# Patient Record
Sex: FEMALE | Race: OTHER | Employment: FULL TIME | URBAN - METROPOLITAN AREA
[De-identification: names, ages, dates, MRNs, and addresses within clinical notes are randomized per-mention and may not be internally consistent; named-entity substitution may affect disease eponyms.]

---

## 2019-03-21 ENCOUNTER — HOSPITAL ENCOUNTER (OUTPATIENT)
Dept: LAB | Age: 29
Discharge: HOME | End: 2019-03-21

## 2019-03-21 ENCOUNTER — OFFICE VISIT (OUTPATIENT)
Dept: FAMILY MEDICINE CLINIC | Age: 29
End: 2019-03-21

## 2019-03-21 VITALS
TEMPERATURE: 97.8 F | HEIGHT: 55 IN | HEART RATE: 63 BPM | DIASTOLIC BLOOD PRESSURE: 56 MMHG | SYSTOLIC BLOOD PRESSURE: 113 MMHG | WEIGHT: 105.8 LBS | BODY MASS INDEX: 24.48 KG/M2

## 2019-03-21 DIAGNOSIS — N89.8 VAGINAL DISCHARGE: Primary | ICD-10-CM

## 2019-03-21 DIAGNOSIS — N89.8 VAGINAL DISCHARGE: ICD-10-CM

## 2019-03-21 PROCEDURE — 87086 URINE CULTURE/COLONY COUNT: CPT

## 2019-03-21 PROCEDURE — 80048 BASIC METABOLIC PNL TOTAL CA: CPT

## 2019-03-21 RX ORDER — FLUCONAZOLE 150 MG/1
150 TABLET ORAL DAILY
Qty: 1 TAB | Refills: 0 | Status: SHIPPED | OUTPATIENT
Start: 2019-03-21 | End: 2019-03-22

## 2019-03-21 RX ORDER — METRONIDAZOLE 500 MG/1
500 TABLET ORAL 2 TIMES DAILY
Qty: 14 TAB | Refills: 0 | Status: SHIPPED | OUTPATIENT
Start: 2019-03-21 | End: 2019-03-28

## 2019-03-21 NOTE — PROGRESS NOTES
Assessment/Plan:       ICD-10-CM ICD-9-CM    1. Vaginal discharge N89.8 623.5 CULTURE, URINE      METABOLIC PANEL, BASIC      metroNIDAZOLE (FLAGYL) 500 mg tablet          [facility] [address] [place] [address] [address] [address]  Phone: [phone] Fax: [phone]      [address]  Subjective:     Chief Complaint   Patient presents with    Vaginal Discharge    Kellie Forrest is a 29 y.o. OTHER female. HPI: while she was pregnant she was told her glucose was a little high. FH negative for diabetes. This was in [place]. How long? 4 months  Character? Yellow, pain, itch, burn, odor  Ever before? After having her baby she had this. This was 5 years ago. No change in weight/fit of clothes. She went to the doctor and had a pap smear and had treatment. She  has no past medical history on file. Review of Systems: Negative for: fever, chest pain, shortness of breath, leg swelling, exertional dyspnea, palpitations. Current Medications:   No current outpatient medications on file prior to visit. No current facility-administered medications on file prior to visit. Social History: She  reports that she has never smoked. She has never used smokeless tobacco. She reports that she drank alcohol. She reports that she does not use drugs. Objective:     Vitals:    03/21/19 1138   BP: 113/56   Pulse: 63   Temp: 97.8 °F (36.6 °C)   TempSrc: Oral   Weight: 105 lb 12.8 oz (48 kg)   Height: 4' 7.32\" (1.405 m)    Patient's last menstrual period was 03/11/2019. Wt Readings from Last 2 Encounters:   03/21/19 105 lb 12.8 oz (48 kg)     Physical Examination:  Pelvic exam - no cervical motion tenderness, no adnexal tenderness; malodorous clear  Discharge noted  General appearance - well developed, no acute distress. Chest - clear to auscultation.   Heart - regular rate and rhythm without murmurs, rubs, or gallops. Abdomen - bowel sounds present x 4, + suprapubic tenderness, ND. Extremities - pulses intact. No peripheral edema. Assessment/Plan:   Diagnoses and all orders for this visit:    1. Vaginal discharge  -     CULTURE, URINE; Future  -     METABOLIC PANEL, BASIC; Future  -     metroNIDAZOLE (FLAGYL) 500 mg tablet; Take 1 Tab by mouth two (2) times a day for 7 days. Turkmen sig    Other orders  -     fluconazole (DIFLUCAN) 150 mg tablet; Take 1 Tab by mouth daily for 1 day. Turkmen sig       While menstruating doesn't have the odor but this comes and goes. Kimmy Whipple, DNP, FNP-BC, BC-ADM  Kellie Burrell expressed understanding of this plan.

## 2019-03-21 NOTE — PROGRESS NOTES
Patient seen for discharge with assistance from alicia Lopez. We reviewed the AVS, prescriptions, pharmacy location and the two printed Good Rx coupons for her prescriptions. She has been to the lab and had no questions at the time of discharge.  Ariela Hahn RN

## 2019-03-22 LAB
ANION GAP SERPL CALC-SCNC: 3 MMOL/L (ref 5–15)
BUN SERPL-MCNC: 11 MG/DL (ref 6–20)
BUN/CREAT SERPL: 19 (ref 12–20)
CALCIUM SERPL-MCNC: 9.2 MG/DL (ref 8.5–10.1)
CHLORIDE SERPL-SCNC: 106 MMOL/L (ref 97–108)
CO2 SERPL-SCNC: 30 MMOL/L (ref 21–32)
CREAT SERPL-MCNC: 0.57 MG/DL (ref 0.55–1.02)
GLUCOSE SERPL-MCNC: 88 MG/DL (ref 65–100)
POTASSIUM SERPL-SCNC: 4 MMOL/L (ref 3.5–5.1)
SODIUM SERPL-SCNC: 139 MMOL/L (ref 136–145)

## 2019-03-23 LAB
BACTERIA SPEC CULT: NORMAL
CC UR VC: NORMAL
SERVICE CMNT-IMP: NORMAL

## 2019-07-11 ENCOUNTER — CLINICAL SUPPORT (OUTPATIENT)
Dept: FAMILY MEDICINE CLINIC | Age: 29
End: 2019-07-11

## 2019-07-11 DIAGNOSIS — N89.8 VAGINAL DISCHARGE: Primary | ICD-10-CM

## 2019-07-11 NOTE — PROGRESS NOTES
Pt came to clinic to request lab results from March, 2019. RN reviewed Monse Duty lab result note:  Notes recorded by Narciso Stanley NP on 3/23/2019 at 5:08 PM EDT  Negative for UTI.  Treatment was appropriate.  Return if not improving. Pt reported that her symptoms improved and does not have any issues now.   Ervin Handley RN

## 2019-11-19 ENCOUNTER — OFFICE VISIT (OUTPATIENT)
Dept: FAMILY MEDICINE CLINIC | Age: 29
End: 2019-11-19

## 2019-11-19 VITALS
TEMPERATURE: 97.6 F | HEIGHT: 57 IN | BODY MASS INDEX: 23.3 KG/M2 | DIASTOLIC BLOOD PRESSURE: 77 MMHG | WEIGHT: 108 LBS | SYSTOLIC BLOOD PRESSURE: 131 MMHG | HEART RATE: 63 BPM

## 2019-11-19 DIAGNOSIS — Z23 ENCOUNTER FOR IMMUNIZATION: ICD-10-CM

## 2019-11-19 DIAGNOSIS — N30.00 ACUTE CYSTITIS WITHOUT HEMATURIA: ICD-10-CM

## 2019-11-19 DIAGNOSIS — R19.5 HARD STOOL: ICD-10-CM

## 2019-11-19 DIAGNOSIS — N89.8 VAGINAL DISCHARGE: Primary | ICD-10-CM

## 2019-11-19 LAB
BILIRUB UR QL STRIP: NEGATIVE
GLUCOSE UR-MCNC: NEGATIVE MG/DL
KETONES P FAST UR STRIP-MCNC: NEGATIVE MG/DL
PH UR STRIP: 6.5 [PH] (ref 4.6–8)
PROT UR QL STRIP: NEGATIVE
SP GR UR STRIP: 1.01 (ref 1–1.03)
UA UROBILINOGEN AMB POC: NORMAL (ref 0.2–1)
URINALYSIS CLARITY POC: NORMAL
URINALYSIS COLOR POC: YELLOW
URINE BLOOD POC: NEGATIVE
URINE LEUKOCYTES POC: NORMAL
URINE NITRITES POC: NEGATIVE

## 2019-11-19 RX ORDER — NITROFURANTOIN 25; 75 MG/1; MG/1
100 CAPSULE ORAL 2 TIMES DAILY
Qty: 14 CAP | Refills: 0 | Status: SHIPPED | OUTPATIENT
Start: 2019-11-19 | End: 2021-08-30

## 2019-11-19 NOTE — PROGRESS NOTES
Kellie Syed [facility]  Requests flu vaccine. Denies fever and egg allergy. VIS information sheet given to patient. Explained possible s/e. Reviewed s/sx indicating need to be seen in ER. Patient had no adverse reaction at time of discharge. Entered into VIIS. GIVEN BY GRETEL KAUFFMAN, Citlaly Mccann LPN.  Mikey Matt RN

## 2019-11-19 NOTE — PROGRESS NOTES
Assessment/Plan:    Diagnoses and all orders for this visit:    1. Vaginal discharge  -     AMB POC URINALYSIS DIP STICK MANUAL W/O MICRO    2. Hard stool  -     docusate sodium (COLACE) 50 mg capsule; Take 1 Cap by mouth two (2) times daily as needed for Constipation. French sig    3. Acute cystitis without hematuria  -     nitrofurantoin, macrocrystal-monohydrate, (MACROBID) 100 mg capsule; Take 1 Cap by mouth two (2) times a day. [place] 1 [employer] veces al isabela por 7 godinez        Follow-up and Dispositions    · Return if symptoms worsen or fail to improve, for pap clinic . PERLA Maldnoado expressed understanding of this plan. An AVS was printed and given to the patient.      ----------------------------------------------------------------------    Chief Complaint   Patient presents with    Constipation     x 2 weeks    Vaginal Discharge     x 1 month       History of Present Illness:  33 yo single mother, here for burning with urination for 1 week. Last time she had intercourse was 1 month ago and it was PROTECTED intercourse. She states that she always uses condoms due to the risk of infection. She has yellowish, non odorous vaginal discharge. Her LMP was one week ago. She has no pelvic pain. She is , she has a 10 yo daughter  No fever, no back pain   She complains of constipation but when questioned further she has daily BM but it is described as \"hard stool\". No change in bowel habits. No past medical history on file. Current Outpatient Medications   Medication Sig Dispense Refill    nitrofurantoin, macrocrystal-monohydrate, (MACROBID) 100 mg capsule Take 1 Cap by mouth two (2) times a day. Dave 1 keyur augustin al isabela por 7 godinez 14 Cap 0    docusate sodium (COLACE) 50 mg capsule Take 1 Cap by mouth two (2) times daily as needed for Constipation.  French sig 60 Cap 2       No Known Allergies    Social History     Tobacco Use    Smoking status: Never Smoker    Smokeless tobacco: Never Used   Substance Use Topics    Alcohol use: Not Currently    Drug use: Never       No family history on file.     Physical Exam:     Visit Vitals  /77 (BP 1 Location: Left arm, BP Patient Position: Sitting)   Pulse 63   Temp 97.6 °F (36.4 °C) (Oral)   Ht 4' 8.54\" (1.436 m)   Wt 108 lb (49 kg)   LMP 11/16/2019   BMI 23.76 kg/m²     gen looks well, pleasant  A&Ox3  WDWN NAD  Respirations normal and non labored  Results for orders placed or performed in visit on 11/19/19   AMB POC URINALYSIS DIP STICK MANUAL W/O MICRO     Status: None   Result Value Ref Range Status    Color (UA POC) Yellow  Final    Clarity (UA POC) Cloudy  Final    Glucose (UA POC) Negative Negative Final    Bilirubin (UA POC) Negative Negative Final    Ketones (UA POC) Negative Negative Final    Specific gravity (UA POC) 1.010 1.001 - 1.035 Final    Blood (UA POC) Negative Negative Final    pH (UA POC) 6.5 4.6 - 8.0 Final    Protein (UA POC) Negative Negative Final    Urobilinogen (UA POC) normal 0.2 - 1 Final    Nitrites (UA POC) Negative Negative Final    Leukocyte esterase (UA POC) 1+ Negative Final               abd- soft, not tender, no masses

## 2019-11-19 NOTE — PROGRESS NOTES
[employer] phone [phone]    AVS printed and reviewed. E scripts discussed. Good Rx available for [employer]thia [employer] sent to [employer]. , Printed. PAP appt has been scheduled at registration. Encouraged to get flu vaccine at clinic today. F/u as needed by making the line to see a provider.

## 2019-11-19 NOTE — PROGRESS NOTES
Results for orders placed or performed in visit on 11/19/19   AMB POC URINALYSIS DIP STICK MANUAL W/O MICRO   Result Value Ref Range    Color (UA POC) Yellow     Clarity (UA POC) Cloudy     Glucose (UA POC) Negative Negative    Bilirubin (UA POC) Negative Negative    Ketones (UA POC) Negative Negative    Specific gravity (UA POC) 1.010 1.001 - 1.035    Blood (UA POC) Negative Negative    pH (UA POC) 6.5 4.6 - 8.0    Protein (UA POC) Negative Negative    Urobilinogen (UA POC) normal 0.2 - 1    Nitrites (UA POC) Negative Negative    Leukocyte esterase (UA POC) 1+ Negative

## 2019-11-19 NOTE — PATIENT INSTRUCTIONS
Infección urinaria en las mujeres: Instrucciones de cuidado - [ Urinary Tract Infection in Women: Care Instructions ]  Instrucciones de cuidado    Reji infección urinaria (UTI, por flakita siglas en inglés) es un término general que hace referencia a reji infección que se produce en cualquier parte entre los riñones y la uretra (conducto por el cual se expulsa la orina). La mayoría de las UTI son infecciones de la vejiga. Con frecuencia, causan dolor o ardor al MissaelGalina. Las UTI son causadas por bacterias y pueden curarse con antibióticos. Asegúrese de completar el tratamiento para que la infección desaparezca. La atención de seguimiento es reji parte clave de vera tratamiento y seguridad. Asegúrese de hacer y acudir a todas las citas, y llame a vera médico si está teniendo problemas. También es reji buena idea saber los resultados de flakita exámenes y mantener reji lista de los medicamentos que bar. ¿Cómo puede cuidarse en el hogar? · [address] No deje de tomarlos por el hecho de sentirse mejor. Debe lee todos los antibióticos hasta terminarlos. · Ke los próximos tayla o [address], alexandra mayor cantidad de [place] y otros líquidos. Gab[place] puede ayudar a eliminar las bacterias que provocan la infección. (Si tiene reji enfermedad de los riñones, el corazón o el hígado y tiene que [place]'s líquidos, hable con vera médico antes de aumentar vera consumo). · Evite las bebidas gaseosas o con cafeína. Pueden irritar la vejiga. · Orine con frecuencia. Trate de vaciar la vejiga cada vez que orine. · Para aliviar el dolor, tome un baño caliente o colóquese reji almohadilla térmica a baja temperatura sobre la parte baja del abdomen o la jackeline genital. Nunca se duerma mientras [place] reji almohadilla térmica. Para prevenir las infecciones urinarias  · Alexandra abundante agua todos los días. Gabbs la ayuda a orinar con frecuencia, lo que elimina las bacterias de vera organismo.  (Si tiene [employer] riñones, el corazón o el hígado y tiene que Tadeo's líquidos, hable con vera médico antes de aumentar vera consumo). · Orine cuando necesite hacerlo. · Orine inmediatamente después de liz tenido [employer]. · Cámbiese las toallas sanitarias con frecuencia. · Evite el uso de lavados vaginales, los omar de burbujas, los [place]ter[place] de higiene femenina y otros productos para la higiene femenina que contengan desodorantes. · Después de ir al baño, límpiese de adelante hacia atrás. ¿Cuándo debes pedir ayuda? Llama a tu médico ahora mismo o busca atención médica inmediata si:    · Aparecen síntomas jered fiebre, escalofríos, náuseas o vómitos por primera vez, o empeoran.     · Te empieza a doler la espalda, tosha debajo de la caja torácica. A esto se le llama dolor en el flanco.     · Aparece tray o pus en la orina.     · Tienes problemas con los antibióticos.    Presta especial atención a los cambios en tu irma y asegúrate de comunicarte con tu médico si:    · No mejoras después de liz tomado un antibiótico duke 2 días.     · Los síntomas desaparecen y Josefine Alexandre. ¿Dónde puede encontrar más información en inglés? Wilfred Mayfield a http://flora-fadi.info/. Karan Castañeda [id number] en la búsqueda para aprender más acerca de \"Infección urinaria en las mujeres: Instrucciones de cuidado - [ Urinary Tract Infection in Women: Care Instructions ]. \"  Revisado: 19 meagan, 2018  Versión del contenido: 12.2  © 4353-2103 Healthwise, Incorporated. Las instrucciones de cuidado fueron adaptadas bajo licencia por [employer] (which disclaims liability or warranty for this information). Si usted tiene [place] afección médica o sobre estas instrucciones, siempre pregunte a vera profesional de irma. [facility] niega toda garantía o responsabilidad por vera uso de esta información. Estreñimiento:  Instrucciones de cuidado - [ Constipation: Care Instructions ]  Instrucciones de cuidado    Tener estreñimiento significa que usted tiene dificultades para eliminar las heces (evacuaciones del intestino). Las personas eliminan heces entre 3 veces al día y Agustín Leona vez cada 3 días. Lo que es normal para usted puede ser [employer]. El estreñimiento puede ocurrir con dolor en el recto y cólicos. El dolor podría empeorar cuando trata de eliminar las heces. A veces hay pequeñas cantidades de tray charles viva en el papel higiénico o en la superficie de las heces. Jeanerette se debe a las venas dilatadas cerca del recto (hemorroides). Algunos cambios en vera Arzella Foreman y estilo de mary podrían ayudarle a evitar el estreñimiento continuo. Es posible que el médico además le recete medicamentos para ayudar a aflojar las heces. Algunos medicamentos pueden causar estreñimiento. Jeanerette incluye los analgésicos (medicamentos para el dolor) y los antidepresivos. Infórmele a vera médico sobre Marvin Carnes que usted bar. Es posible que vera médico quiera cambiar un medicamento para aliviar flakita síntomas. La atención de seguimiento es reji parte clave de vera tratamiento y seguridad. Asegúrese de hacer y acudir a todas las citas, y llame a vera médico si está teniendo problemas. También es reji buena idea saber los resultados de flakita exámenes y mantener reji lista de los medicamentos que bar. ¿Cómo puede cuidarse en el hogar? · Alexandra abundantes líquidos, los suficientes jered para que vera orina sea de color amarillo corazon o transparente jered el agua. Si tiene [employer], del corazón o del hígado y tiene que Tadeo's líquidos, hable con vera médico antes de aumentar vera consumo. · Incluya en vera dieta diaria alimentos ricos en fibra. Estos incluyen frutas, verduras, frijoles (habichuelas) y granos integrales. · Krystle por lo menos 30 minutos de ejercicio la mayoría de los días de la [place]. Caminar es reji buena opción.  Es posible que también DUVED hacer otras actividades, jered correr, nadar, montar en bicicleta, o jugar al tenis u otros deportes de equipo. · [place]aks un suplemento de Ericka, jered Citrucel o Metamucil, todos los GRASSE. Sujata y siga todas las indicaciones de la [place]. · Programe tiempo todos los días para evacuar el intestino. Andrea Siad rutina diaria podría ayudar. Tómese vera tiempo para evacuar el intestino. · Apoye los pies sobre un banco o taburete pequeño cuando se siente en el inodoro. [employer] ayuda a flexionar las caderas y coloca la pelvis en posición de cuclillas. · Vera médico podría recomendarle un laxante de venta darell para aliviar el estreñimiento. Kit Staff son Dunne[place] Face de Magnesia (Milk of Magnesia) y [place]. Sujata y siga todas las instrucciones de la [place]. No use laxantes de [employer]. ¿Cuándo debe pedir ayuda? Llame a vera médico ahora mismo o busque atención médica inmediata si:    · Tiene dolor abdominal nuevo o peor.     · Tiene náuseas o vómito nuevos o peores.     · Tiene tray en las heces.    Preste especial atención a los cambios en vera irma y asegúrese de comunicarse con vera médico si:    · Vera estreñimiento empeora.     · No mejora jered se esperaba. ¿Dónde puede encontrar más información en inglés? Andrea Brian a http://flora-fadi.info/. Escriba P343 en la búsqueda para aprender Yu Yoder de \"Estreñimiento: Instrucciones de cuidado - [ Constipation: Care Instructions ]. \"  Revisado: 26 junio, 2019  Versión del contenido: 12.2  © 9629-5540 Healthwise, Incorporated. Las instrucciones de cuidado fueron adaptadas bajo licencia por [employer] (which disclaims liability or warranty for this information). Si usted tiene [facility] afección médica o sobre estas instrucciones, siempre pregunte a vera profesional de irma. [facility] niega toda garantía o responsabilidad por vera uso de esta información.

## 2020-02-03 ENCOUNTER — HOSPITAL ENCOUNTER (OUTPATIENT)
Dept: LAB | Age: 30
Discharge: HOME | End: 2020-02-03

## 2020-02-03 ENCOUNTER — OFFICE VISIT (OUTPATIENT)
Dept: FAMILY MEDICINE CLINIC | Age: 30
End: 2020-02-03

## 2020-02-03 VITALS
DIASTOLIC BLOOD PRESSURE: 61 MMHG | TEMPERATURE: 98.2 F | HEART RATE: 69 BPM | WEIGHT: 110 LBS | HEIGHT: 57 IN | SYSTOLIC BLOOD PRESSURE: 106 MMHG | BODY MASS INDEX: 23.73 KG/M2

## 2020-02-03 DIAGNOSIS — N89.8 VAGINAL DISCHARGE: Primary | ICD-10-CM

## 2020-02-03 DIAGNOSIS — Z01.419 ENCOUNTER FOR WELL WOMAN EXAM: ICD-10-CM

## 2020-02-03 PROCEDURE — 88142 CYTOPATH C/V THIN LAYER: CPT

## 2020-02-03 PROCEDURE — 87591 N.GONORRHOEAE DNA AMP PROB: CPT

## 2020-02-03 NOTE — PROGRESS NOTES
Assessment/Plan:    Diagnoses and all orders for this visit:    1. Vaginal discharge  -     CHLAMYDIA / GC-AMPLIFIED  Low index of suspicion but she is concerned about an abnl discharge (not seen on exam today as pt is on her period)  States always uses protected sex (condoms) and last intercourse was between 6-7 months ago     2. Encounter for well woman exam  -     PAP, LIQUID BASED, MANUAL SCREEN; Future    Pt has mild tenderness with left adnexal region palpation but no masses are appreciated. She will be instructed to call if the discomfort persists and I will order a pelvic ultrasound at that time. Follow-up and Dispositions    · Return in about 1 year (around 2/3/2021). PERLA Baig expressed understanding of this plan. An AVS was printed and given to the patient.      ----------------------------------------------------------------------    Chief Complaint   Patient presents with    Well Woman     last PAP smear was 2 years ago w/ normal results. History of Present Illness:  G31, has a 9year old child  Single mother. Has a boyfriend who visits 1-2 times a year and she always uses condoms with him because \"I don't know if he has other partners\"  Is concerned that she has a yellowish discharge. We talked about vaginal discharge and the monthly changes that are expected. She states that she is not having intercourse so she doesn't think that she has any pain with intercourse (she had told me this before). She had her last pap a few years ago and it was normal  She has not had any abnl paps  She has regular periods  She is not at risk for DV at this time        No past medical history on file. Current Outpatient Medications   Medication Sig Dispense Refill    nitrofurantoin, macrocrystal-monohydrate, (MACROBID) 100 mg capsule Take 1 Cap by mouth two (2) times a day.  [place] 1 pastilla dos veces al isabela por 7 godinez 14 Cap 0    docusate sodium (COLACE) 50 mg capsule Take 1 Cap by mouth two (2) times daily as needed for Constipation. Malay sig 60 Cap 2       No Known Allergies    Social History     Tobacco Use    Smoking status: Never Smoker    Smokeless tobacco: Never Used   Substance Use Topics    Alcohol use: Not Currently    Drug use: Never       No family history on file. Physical Exam:     Visit Vitals  /61 (BP 1 Location: Left arm, BP Patient Position: Sitting)   Pulse 69   Temp 98.2 °F (36.8 °C) (Oral)   Ht 4' 8.54\" (1.436 m)   Wt 110 lb (49.9 kg)   LMP 01/27/2020   BMI 24.20 kg/m²       A&Ox3  WDWN NAD  Respirations normal and non labored  Pelvic exam- ext with some menstrual bleeding present, no lesions.  Cervix + menstrual bleeding moderate, no discharge visualized  Uterus and adnexal exam- no CMT, some mild discomfort over left adnexal region w/out any mass

## 2020-02-03 NOTE — PATIENT INSTRUCTIONS
Visita de control para personas de 18 a 50 años: Instrucciones de cuidado - [ Well Visit, Ages 25 to 48: Care Instructions ]  Instrucciones de cuidado    Los exámenes físicos pueden ayudarle a mantenerse saludable. Vera médico payne revisado vera estado general de irma y podría haberle dado algunos consejos para cuidarse. También podría haberle recomendado otros exámenes. En vera hogar, usted puede ayudar a prevenir enfermedades si come de manera saludable, hace ejercicio con regularidad y sigue otras recomendaciones. La atención de seguimiento es reji parte clave de vera tratamiento y seguridad. Asegúrese de hacer y acudir a todas las citas, y llame a vera médico si está teniendo problemas. También es reji buena idea saber los resultados de flakita exámenes y mantener reji lista de los medicamentos que bar. ¿Cómo puede cuidarse en el [facility]? · Alcance un peso saludable y [place]. [place] disminuirá el riesgo de tener [employer], tales jered obesidad, diabetes, enfermedad cardíaca y presión arterial guevara. · Krystle actividad física por lo menos 30 minutos la mayoría de los días de la [place]. Caminar es reji buena opción. Byron Dus desee hacer otras actividades, jered correr, nadar, [employer], o jugar al tenis u otros deportes de equipo. Discuta con vera médico cualquier cambio que quiera introducir en vera programa de ejercicios. · No fume ni permita que otros fumen cerca de usted. Si necesita ayuda para dejar de fumar, hable con vera médico sobre programas y medicamentos para dejar de fumar. Pueden aumentar flakita probabilidades de dejar el hábito para siempre. · Consulte con vera médico si presenta factores de riesgo de infecciones de transmisión sexual (STI, por flakita siglas en inglés). Tener reji guzman irma sexual (que no tenga STI y que no tenga relaciones sexuales con nadie más) es reji buena manera de evitar estas infecciones. · Utilice métodos anticonceptivos si no desea tener hijos en della momento.  Consulte con vera médico acerca de las opciones disponibles más adecuadas para usted. · Protéjase la piel del exceso de sol. [address] 10 a.m. y las 4 p.m., permanezca a la wayne o Emmit Topher con prendas de vestir y un sombrero de ala ancha. Use gafas de sol que bloqueen los miles ultravioleta. Póngase un protector solar de amplio espectro (SPF 30 o superior) en la piel expuesta, incluso cuando esté nublado. · Acuda al dentista CHARLENE [facility] veces al año para hacerse chequeos y limpiezas dentales. · En el automóvil, use el cinturón de seguridad. Siga las recomendaciones de vera médico acerca de cuándo hacerse determinados exámenes. Estas pruebas pueden detectar problemas a tiempo. Para ambos sexos  · Colesterol. Hágase un análisis de la grasa (colesterol) en la tray después de los 21 años de Sebastian. Vera médico le indicará con qué frecuencia debe [employer] análisis según vera edad, flakita antecedentes familiares u otros factores que pueden aumentar el riesgo de enfermedad cardíaca. · Presión arterial. Hágase lee la presión arterial duke reji visita de rutina al médico. Vera médico le dirá con qué frecuencia debe revisarse la presión arterial según vera edad, flakita niveles de presión arterial y otros factores. · Visión. Hable con vera médico acerca de la frecuencia con que debe hacerse reji prueba de glaucoma. · Diabetes. Pregúntele a vera médico si debería hacerse pruebas para la diabetes. · Cáncer de colon. Vera riesgo de cáncer colorrectal aumenta a medida que envejece. Algunos especialistas dicen que los adultos deberían comenzar a hacerse pruebas de detección regulares a los 48 años y dejar de hacérselas a los 76 años. Otros dicen que hay que comenzar antes de los 48 años o continuar después de los 75 [address] [facility]. Hable con vera médico acerca de vera riesgo y de cuándo comenzar y dejar de hacerse pruebas de detección. Para las mujeres  · Examen de jhonatan y [place].  Pregúntele a vera médico cuándo debe hacerse un examen clínico de los senos (mamas) y Ndiaye. Los expertos médicos no están de acuerdo en si reji samantha de menos de 48 años de edad debe o no hacerse estos exámenes o con qué frecuencia. Vera médico puede ayudarle a decidir qué es lo adecuado para usted. · Prueba de detección del cáncer del divina uterino y examen pélvico. Comience a hacerse la prueba de Papanicolaou a los 21 años. La prueba a menudo es parte de un examen pélvico. A partir de los 30 Los ryley, puede optar por hacerse reji prueba de Papanicolaou, Florestine Niko prueba del [facility] o ambas pruebas al mismo tiempo (lo que se llama prueba doble o conjunta). Hable con vera médico sobre la frecuencia con la que debe realizarse las pruebas. · Infecciones de transmisión sexual (STI, por flakita siglas en inglés). Consulte si debe hacerse pruebas de detección de STI. Puede correr riesgo si tiene [employer] con más de Florestine Niko persona, especialmente si flakita parejas no utilizan condones. Para los hombres  · Infecciones de transmisión sexual (STI). Consulte si debe hacerse pruebas de detección de STI. Puede correr riesgo si tiene [employer] con más de Florestine Niko persona, especialmente si usted no utiliza condón. · Examen para el cáncer testicular. Pregúntele a vera médico si debería hacerse un examen de testículos con regularidad. · Examen de próstata. Hable con vera médico para saber si debe hacerse un análisis de tray para el cáncer de próstata (prueba del PSA, por flakita siglas en inglés). Los expertos difieren en cuanto a si los hombres deben hacerse esta prueba y con qué frecuencia. Algunos especialistas lo recomiendan a las personas mayores de 39 años de origen afroamericano o que tienen un padre o un bridget que tuvo cáncer de próstata antes de los 65 Los ryley. ¿Cuándo debe pedir ayuda? Preste especial atención a los cambios en vera irma y asegúrese de comunicarse con vera médico si tiene problemas o síntomas que le preocupan. ¿Dónde puede encontrar más información en inglés?   Krista Clay a http://flora-fadi.info/. Escriba P072 en la búsqueda para aprender más acerca de \"Visita de control para personas de 18 a 50 años: Instrucciones de cuidado - [ Well Visit, Ages 25 to 48: Care Instructions ]. \"  Revisado: 13 meagan, 2018  Versión del contenido: 12.2  © 0675-8122 Healthwise, Incorporated. Las instrucciones de cuidado fueron adaptadas bajo licencia por [employer] (which disclaims liability or warranty for this information). Si usted tiene [facility] afección médica o sobre estas instrucciones, siempre pregunte a vera profesional de irma. Healthwise, Incorporated niega toda garantía o responsabilidad por vera uso de esta información.

## 2020-02-03 NOTE — PROGRESS NOTES
.The following was performed at discharge station per provider with the assistance of , Jasper Turner:    AVS printed, reviewed with pt and given to pt. RN advised pt to call the RN at the office if her discomfort does not go away in a few days, and an US of left ovary would be ordered. RN advised that she will be notified if the GC/Ct lab is positive. Pt expressed understanding of the above information. Daniella Hager.

## 2020-02-05 LAB
C TRACH DNA SPEC QL NAA+PROBE: NEGATIVE
N GONORRHOEA DNA SPEC QL NAA+PROBE: NEGATIVE
SAMPLE TYPE: NORMAL
SERVICE CMNT-IMP: NORMAL
SPECIMEN SOURCE: NORMAL

## 2020-02-18 NOTE — PROGRESS NOTES
Tc to the pt. 2x on the home number listed. No answer. Left a message. Tc to the emergency # 2x. No answer. Left a message. A letter was mailed to the pt with the message to contact the [facility] office for her lab results.  Maximilian Lomeli RN

## 2021-08-30 ENCOUNTER — OFFICE VISIT (OUTPATIENT)
Dept: FAMILY MEDICINE CLINIC | Age: 31
End: 2021-08-30

## 2021-08-30 ENCOUNTER — HOSPITAL ENCOUNTER (OUTPATIENT)
Dept: LAB | Age: 31
Discharge: HOME | End: 2021-08-30

## 2021-08-30 VITALS
WEIGHT: 110 LBS | OXYGEN SATURATION: 100 % | HEIGHT: 55 IN | HEART RATE: 68 BPM | TEMPERATURE: 98 F | DIASTOLIC BLOOD PRESSURE: 60 MMHG | SYSTOLIC BLOOD PRESSURE: 100 MMHG | BODY MASS INDEX: 25.46 KG/M2

## 2021-08-30 DIAGNOSIS — R10.13 EPIGASTRIC PAIN: ICD-10-CM

## 2021-08-30 DIAGNOSIS — R10.13 EPIGASTRIC PAIN: Primary | ICD-10-CM

## 2021-08-30 PROCEDURE — 99213 OFFICE O/P EST LOW 20 MIN: CPT | Performed by: FAMILY MEDICINE

## 2021-08-30 PROCEDURE — 83036 HEMOGLOBIN GLYCOSYLATED A1C: CPT

## 2021-08-30 PROCEDURE — 84443 ASSAY THYROID STIM HORMONE: CPT

## 2021-08-30 PROCEDURE — 80061 LIPID PANEL: CPT

## 2021-08-30 PROCEDURE — 85025 COMPLETE CBC W/AUTO DIFF WBC: CPT

## 2021-08-30 RX ORDER — FAMOTIDINE 40 MG/1
40 TABLET, FILM COATED ORAL
Qty: 30 TABLET | Refills: 3 | Status: SHIPPED | OUTPATIENT
Start: 2021-08-30

## 2021-08-30 NOTE — PROGRESS NOTES
HISTORY OF PRESENT ILLNESS  Kellie Cheatham is a 32 y.o. female. HPI  Patient states she feels like her pressure has increased. Her weight has decreased. And feels her abdomen is bloated. She is having epigastric pain. Worse when she eats. States she has gastric reflux. And feels the acid in her throat. She has 1 daughter, age 9. Denies any problems during pregnancy. Review of Systems   Constitutional: Negative for chills and fever. HENT: Negative for hearing loss, sinus pain and tinnitus. Eyes: Negative for blurred vision, double vision and photophobia. Cardiovascular: Negative for chest pain and palpitations. Gastrointestinal: Negative for heartburn, nausea and vomiting. Skin: Negative for itching and rash. /60 (BP 1 Location: Left arm, BP Patient Position: Sitting)   Pulse 68   Temp 98 °F (36.7 °C)   Ht 4' 6.92\" (1.395 m)   Wt 110 lb (49.9 kg)   LMP 08/22/2021   SpO2 100%   BMI 25.64 kg/m²   Physical Exam  Constitutional:       General: She is not in acute distress. HENT:      Head: Normocephalic. Right Ear: Tympanic membrane normal.      Left Ear: Tympanic membrane normal.      Nose: Nose normal. No congestion. Cardiovascular:      Rate and Rhythm: Normal rate and regular rhythm. Pulses: Normal pulses. Heart sounds: Normal heart sounds. No murmur heard. Pulmonary:      Effort: Pulmonary effort is normal. No respiratory distress. Breath sounds: Normal breath sounds. No wheezing or rhonchi. Abdominal:      General: Bowel sounds are normal. There is no distension. Palpations: Abdomen is soft. Tenderness: There is no abdominal tenderness. Hernia: No hernia is present. Musculoskeletal:         General: No swelling, deformity or signs of injury. Normal range of motion. Cervical back: Normal range of motion. No rigidity. Skin:     General: Skin is warm. Findings: No erythema or rash.    Neurological:      Mental Status: She is alert. ASSESSMENT and PLAN  Diagnoses and all orders for this visit:    1. Epigastric pain  -     CBC WITH AUTOMATED DIFF; Future  -     LIPID PANEL; Future  -     METABOLIC PANEL, COMPREHENSIVE; Future  -     TSH 3RD GENERATION; Future  -     HEMOGLOBIN A1C WITH EAG; Future  -     famotidine (PEPCID) 40 mg tablet; Take 1 Tablet by mouth nightly. 32year old female with epigastric pain.   Dietary changes discussed, we will write for pepcid  Check labs today  Follow up as needed

## 2021-08-30 NOTE — PROGRESS NOTES
Coordination of Care  1. Have you been to the ER, urgent care clinic since your last visit? Hospitalized since your last visit? No    2. Have you seen or consulted any other health care providers outside of the [address] [place] [address] since your last visit? Include any pap smears or colon screening. No    Does the patient need refills? NO    Learning Assessment Complete?  yes  Depression Screening complete in the past 12 months? yes

## 2021-08-30 NOTE — PROGRESS NOTES
I reviewed with patient medications sent to pharmacy and gave Good RX card. I gave handout to patient that explains the Good RX process. Dental resources provided. An After Visit Summary was printed and reviewed with the patient.   Gaston Ortega

## 2021-08-31 LAB
BASOPHILS # BLD: 0 K/UL (ref 0–0.1)
BASOPHILS NFR BLD: 1 % (ref 0–1)
CHOLEST SERPL-MCNC: 191 MG/DL
DIFFERENTIAL METHOD BLD: ABNORMAL
EOSINOPHIL # BLD: 0.2 K/UL (ref 0–0.4)
EOSINOPHIL NFR BLD: 3 % (ref 0–7)
ERYTHROCYTE [DISTWIDTH] IN BLOOD BY AUTOMATED COUNT: 12.1 % (ref 11.5–14.5)
EST. AVERAGE GLUCOSE BLD GHB EST-MCNC: 105 MG/DL
HBA1C MFR BLD: 5.3 % (ref 4–5.6)
HCT VFR BLD AUTO: 42.9 % (ref 35–47)
HDLC SERPL-MCNC: 41 MG/DL
HDLC SERPL: 4.7 {RATIO} (ref 0–5)
HGB BLD-MCNC: 13.9 G/DL (ref 11.5–16)
IMM GRANULOCYTES # BLD AUTO: 0 K/UL (ref 0–0.04)
IMM GRANULOCYTES NFR BLD AUTO: 0 % (ref 0–0.5)
LDLC SERPL CALC-MCNC: 111.2 MG/DL (ref 0–100)
LYMPHOCYTES # BLD: 2.8 K/UL (ref 0.8–3.5)
LYMPHOCYTES NFR BLD: 34 % (ref 12–49)
MCH RBC QN AUTO: 31.5 PG (ref 26–34)
MCHC RBC AUTO-ENTMCNC: 32.4 G/DL (ref 30–36.5)
MCV RBC AUTO: 97.3 FL (ref 80–99)
MONOCYTES # BLD: 0.6 K/UL (ref 0–1)
MONOCYTES NFR BLD: 8 % (ref 5–13)
NEUTS SEG # BLD: 4.6 K/UL (ref 1.8–8)
NEUTS SEG NFR BLD: 54 % (ref 32–75)
NRBC # BLD: 0 K/UL (ref 0–0.01)
NRBC BLD-RTO: 0 PER 100 WBC
PLATELET # BLD AUTO: 367 K/UL (ref 150–400)
PMV BLD AUTO: 8.8 FL (ref 8.9–12.9)
RBC # BLD AUTO: 4.41 M/UL (ref 3.8–5.2)
TRIGL SERPL-MCNC: 194 MG/DL (ref ?–150)
TSH SERPL DL<=0.05 MIU/L-ACNC: 0.78 UIU/ML (ref 0.36–3.74)
VLDLC SERPL CALC-MCNC: 38.8 MG/DL
WBC # BLD AUTO: 8.3 K/UL (ref 3.6–11)

## 2021-09-01 NOTE — PROGRESS NOTES
Triglycerides are a little high,  normal thyroid, normal kidney function, liver function, electrolytes, cholesterol, no diabetes  Patient can be informed

## 2021-09-16 ENCOUNTER — TELEPHONE (OUTPATIENT)
Dept: FAMILY MEDICINE CLINIC | Age: 31
End: 2021-09-16

## 2021-09-21 NOTE — PROGRESS NOTES
Tc with the pt. I gave the pt the lab results per provider following notes: \"Triglycerides are a little high,  normal thyroid, normal kidney function, liver function, electrolytes, cholesterol, no diabetes, Patient can be informed. \" Pt verbalized understanding

## 2021-10-04 NOTE — TELEPHONE ENCOUNTER
Tc to the pt. Returning her call for her lab results, 2x no answer. A message was left for her to call the CBN back. Margette Hodgkin was the .   Monica Rodriguez RN

## 2022-01-19 ENCOUNTER — TELEPHONE (OUTPATIENT)
Dept: FAMILY MEDICINE CLINIC | Age: 32
End: 2022-01-19

## 2022-01-19 NOTE — TELEPHONE ENCOUNTER
Tc to the pt to ask if she would like an appt to do a lab that was ordered in 08/30/21. A CMP. She was called 2x. She answered the 2nd time. Int # [id number]. Th ept stated she would like the lab to be drawn. She was scheduled an appt for the lab only CMP for tomorrow at 01/20/22 at 0930 am. The address was reviewed with the pt she stated she knows exactly where [facility].  Ash Cisneros RN

## 2022-01-20 ENCOUNTER — LAB ONLY (OUTPATIENT)
Dept: FAMILY MEDICINE CLINIC | Age: 32
End: 2022-01-20

## 2022-01-20 ENCOUNTER — HOSPITAL ENCOUNTER (OUTPATIENT)
Dept: LAB | Age: 32
Discharge: HOME | End: 2022-01-20

## 2022-01-20 DIAGNOSIS — R10.13 EPIGASTRIC PAIN: Primary | ICD-10-CM

## 2022-01-20 DIAGNOSIS — R10.13 EPIGASTRIC PAIN: ICD-10-CM

## 2022-01-20 PROCEDURE — 80053 COMPREHEN METABOLIC PANEL: CPT

## 2022-01-21 LAB
ALBUMIN SERPL-MCNC: 3.6 G/DL (ref 3.5–5)
ALBUMIN/GLOB SERPL: 1.1 {RATIO} (ref 1.1–2.2)
ALP SERPL-CCNC: 84 U/L (ref 45–117)
ALT SERPL-CCNC: 26 U/L (ref 12–78)
ANION GAP SERPL CALC-SCNC: 3 MMOL/L (ref 5–15)
AST SERPL-CCNC: 5 U/L (ref 15–37)
BILIRUB SERPL-MCNC: 0.5 MG/DL (ref 0.2–1)
BUN SERPL-MCNC: 9 MG/DL (ref 6–20)
BUN/CREAT SERPL: 13 (ref 12–20)
CALCIUM SERPL-MCNC: 9.4 MG/DL (ref 8.5–10.1)
CHLORIDE SERPL-SCNC: 106 MMOL/L (ref 97–108)
CO2 SERPL-SCNC: 28 MMOL/L (ref 21–32)
CREAT SERPL-MCNC: 0.68 MG/DL (ref 0.55–1.02)
GLOBULIN SER CALC-MCNC: 3.4 G/DL (ref 2–4)
GLUCOSE SERPL-MCNC: 86 MG/DL (ref 65–100)
POTASSIUM SERPL-SCNC: 4.4 MMOL/L (ref 3.5–5.1)
PROT SERPL-MCNC: 7 G/DL (ref 6.4–8.2)
SODIUM SERPL-SCNC: 137 MMOL/L (ref 136–145)

## 2022-01-24 NOTE — PROGRESS NOTES
Tc to the pt to give her her lab message from the provider. The call was assisted by Erendira Cassidy. The pt was given the normal lab results. She verbalized understanding. Reviewed her f/U appt with her.  Alena Fang RN

## 2022-02-16 ENCOUNTER — HOSPITAL ENCOUNTER (OUTPATIENT)
Dept: LAB | Age: 32
Discharge: HOME | End: 2022-02-16

## 2022-02-16 ENCOUNTER — OFFICE VISIT (OUTPATIENT)
Dept: FAMILY MEDICINE CLINIC | Age: 32
End: 2022-02-16

## 2022-02-16 VITALS
WEIGHT: 110 LBS | SYSTOLIC BLOOD PRESSURE: 106 MMHG | TEMPERATURE: 97.7 F | HEIGHT: 57 IN | HEART RATE: 69 BPM | DIASTOLIC BLOOD PRESSURE: 67 MMHG | BODY MASS INDEX: 23.73 KG/M2

## 2022-02-16 DIAGNOSIS — N39.0 URINARY TRACT INFECTION WITHOUT HEMATURIA, SITE UNSPECIFIED: Primary | ICD-10-CM

## 2022-02-16 DIAGNOSIS — N39.0 URINARY TRACT INFECTION WITHOUT HEMATURIA, SITE UNSPECIFIED: ICD-10-CM

## 2022-02-16 DIAGNOSIS — R10.13 EPIGASTRIC PAIN: ICD-10-CM

## 2022-02-16 LAB
BILIRUB UR QL STRIP: NEGATIVE
GLUCOSE UR-MCNC: NEGATIVE MG/DL
KETONES P FAST UR STRIP-MCNC: NEGATIVE MG/DL
PH UR STRIP: 6 [PH] (ref 4.6–8)
PROT UR QL STRIP: NEGATIVE
SP GR UR STRIP: 1.03 (ref 1–1.03)
UA UROBILINOGEN AMB POC: NORMAL (ref 0.2–1)
URINALYSIS CLARITY POC: NORMAL
URINALYSIS COLOR POC: YELLOW
URINE BLOOD POC: NEGATIVE
URINE LEUKOCYTES POC: NEGATIVE
URINE NITRITES POC: NEGATIVE

## 2022-02-16 PROCEDURE — 81003 URINALYSIS AUTO W/O SCOPE: CPT | Performed by: FAMILY MEDICINE

## 2022-02-16 PROCEDURE — 99213 OFFICE O/P EST LOW 20 MIN: CPT | Performed by: FAMILY MEDICINE

## 2022-02-16 PROCEDURE — 87086 URINE CULTURE/COLONY COUNT: CPT

## 2022-02-16 RX ORDER — CIPROFLOXACIN 500 MG/1
500 TABLET ORAL 2 TIMES DAILY
Qty: 10 TABLET | Refills: 0 | Status: SHIPPED | OUTPATIENT
Start: 2022-02-16 | End: 2022-02-21

## 2022-02-16 RX ORDER — FLUCONAZOLE 150 MG/1
150 TABLET ORAL DAILY
Qty: 1 TABLET | Refills: 0 | Status: SHIPPED | OUTPATIENT
Start: 2022-02-16 | End: 2022-02-17

## 2022-02-16 NOTE — PROGRESS NOTES
I have texted to pt's mobile phone number the Good RX coupons for prescriptions today that need a coupon discount. Pt understands to show this coupon from the phone to the pharmacist.   An After Visit Summary was printed and reviewed with the patient. Informed patient to give name and date of birth when picking up medication. Patient verbalized understanding.   Franny Mattson

## 2022-02-16 NOTE — PROGRESS NOTES
Coordination of Care  1. Have you been to the ER, urgent care clinic since your last visit? Hospitalized since your last visit? No    2. Have you seen or consulted any other health care providers outside of the [address] [place] [address] since your last visit? Include any pap smears or colon screening. No    Does the patient need refills? NO    Learning Assessment Complete?  yes  Depression Screening complete in the past 12 months? yes     Results for orders placed or performed in visit on 02/16/22   AMB POC URINALYSIS DIP STICK AUTO W/O MICRO   Result Value Ref Range    Color (UA POC) Yellow     Clarity (UA POC) Slightly Cloudy     Glucose (UA POC) Negative Negative    Bilirubin (UA POC) Negative Negative    Ketones (UA POC) Negative Negative    Specific gravity (UA POC) 1.030 1.001 - 1.035    Blood (UA POC) Negative Negative    pH (UA POC) 6.0 4.6 - 8.0    Protein (UA POC) Negative Negative    Urobilinogen (UA POC) 0.2 mg/dL 0.2 - 1    Nitrites (UA POC) Negative Negative    Leukocyte esterase (UA POC) Negative Negative

## 2022-02-16 NOTE — PROGRESS NOTES
HISTORY OF PRESENT ILLNESS  Kellie Aguayo is a 32 y.o. female. HPI  Patient states  She has had suprapubic pain and dysuria. She was able to take some amoxicillin. She was able to take 10 tablets. She continues to have suprapubic pain and dysuria  Review of Systems   Genitourinary: Positive for dysuria. Suprapubic pain   /67 (BP 1 Location: Right arm, BP Patient Position: Sitting)   Pulse 69   Temp 97.7 °F (36.5 °C) (Temporal)   Ht 4' 9.09\" (1.45 m)   Wt 110 lb (49.9 kg)   LMP 02/07/2022   BMI 23.73 kg/m²   Physical Exam  Constitutional:       General: She is not in acute distress. HENT:      Head: Normocephalic. Right Ear: Tympanic membrane normal.      Left Ear: Tympanic membrane normal.      Nose: Nose normal. No congestion. Cardiovascular:      Rate and Rhythm: Normal rate and regular rhythm. Pulses: Normal pulses. Heart sounds: Normal heart sounds. No murmur heard. Pulmonary:      Effort: Pulmonary effort is normal. No respiratory distress. Breath sounds: Normal breath sounds. No wheezing or rhonchi. Abdominal:      General: Bowel sounds are normal. There is no distension. Palpations: Abdomen is soft. Tenderness: There is abdominal tenderness. Hernia: No hernia is present. Comments: Suprapubic pain to palpation   Musculoskeletal:         General: No swelling, deformity or signs of injury. Normal range of motion. Cervical back: Normal range of motion. No rigidity. Skin:     General: Skin is warm. Findings: No erythema or rash. Neurological:      Mental Status: She is alert. ASSESSMENT and PLAN  Diagnoses and all orders for this visit:    1. Urinary tract infection without hematuria, site unspecified  -     ciprofloxacin HCl (CIPRO) 500 mg tablet; Take 1 Tablet by mouth two (2) times a day for 5 days. -     fluconazole (DIFLUCAN) 150 mg tablet; Take 1 Tablet by mouth daily for 1 day.   -     CULTURE, URINE; Future    2.  Epigastric pain  -     AMB POC URINALYSIS DIP STICK AUTO W/O MICRO      32year old famel with UTI,  Symptoms are still present, we will send urine culture and start ciprofloxacin

## 2022-02-19 LAB
BACTERIA SPEC CULT: NORMAL
SERVICE CMNT-IMP: NORMAL

## 2022-09-15 ENCOUNTER — HOSPITAL ENCOUNTER (OUTPATIENT)
Dept: LAB | Age: 32
Discharge: HOME | End: 2022-09-15

## 2022-09-15 ENCOUNTER — OFFICE VISIT (OUTPATIENT)
Dept: FAMILY MEDICINE CLINIC | Age: 32
End: 2022-09-15

## 2022-09-15 VITALS
BODY MASS INDEX: 25.38 KG/M2 | HEART RATE: 63 BPM | OXYGEN SATURATION: 99 % | DIASTOLIC BLOOD PRESSURE: 62 MMHG | SYSTOLIC BLOOD PRESSURE: 98 MMHG | TEMPERATURE: 97.9 F | HEIGHT: 56 IN | WEIGHT: 112.8 LBS

## 2022-09-15 DIAGNOSIS — R30.0 DYSURIA: ICD-10-CM

## 2022-09-15 DIAGNOSIS — N92.0 MENORRHAGIA WITH REGULAR CYCLE: ICD-10-CM

## 2022-09-15 DIAGNOSIS — R07.89 CHEST PRESSURE: ICD-10-CM

## 2022-09-15 DIAGNOSIS — R10.2 PELVIC PAIN: Primary | ICD-10-CM

## 2022-09-15 DIAGNOSIS — R10.2 PELVIC PAIN: ICD-10-CM

## 2022-09-15 PROCEDURE — 87491 CHLMYD TRACH DNA AMP PROBE: CPT

## 2022-09-15 PROCEDURE — 81001 URINALYSIS AUTO W/SCOPE: CPT

## 2022-09-15 PROCEDURE — 84443 ASSAY THYROID STIM HORMONE: CPT

## 2022-09-15 PROCEDURE — 87086 URINE CULTURE/COLONY COUNT: CPT

## 2022-09-15 PROCEDURE — 80053 COMPREHEN METABOLIC PANEL: CPT

## 2022-09-15 PROCEDURE — 85025 COMPLETE CBC W/AUTO DIFF WBC: CPT

## 2022-09-15 PROCEDURE — 99214 OFFICE O/P EST MOD 30 MIN: CPT | Performed by: FAMILY MEDICINE

## 2022-09-15 PROCEDURE — 86140 C-REACTIVE PROTEIN: CPT

## 2022-09-15 NOTE — PROGRESS NOTES
1. Have you been to the ER, urgent care clinic since your last visit? Hospitalized since your last visit? No    2. Have you seen or consulted any other health care providers outside of the [address] [place] [address] since your last visit? Include any pap smears or colon screening.  No

## 2022-09-15 NOTE — PROGRESS NOTES
Name and  confirmed w/ patient. An After Visit Summary was provided and all discharge instructions were reviewed with the patient including: f/up appt and imaging locations. RN assisted patient in making an appt for an ultrasound on 22 at 10:30 am at [facility]. Pt must drink liquid prior to procedure and not urinate for an hour before. Time for questions and answers provided, patient verbalized understanding. Patient discharged from clinic in stable condition. [facility]  # assisted with this d/c.

## 2022-09-15 NOTE — PROGRESS NOTES
Kellie Simmspomarija (: 1990) is a 28 y.o. female, established patient, here for evaluation of the following chief complaint(s):  Heavy Period and Palpitations (With vomiting 2022)       ASSESSMENT/PLAN:  1. Pelvic pain  Will get US. Since it is mostly with menses use Ibuprofen 400mg once daily for first 3 days of menses. -     METABOLIC PANEL, COMPREHENSIVE; Future  -     C REACTIVE PROTEIN, QT; Future  -     US PELV NON OBS; Future  -     US TRANSVAGINAL; Future  2. Menorrhagia with regular cycle  -     CBC WITH AUTOMATED DIFF; Future  3. Chest pressure  With other acute sx suggestive viral syndrome. Her daughter had mild GI sx earlier this week also and so suspect viral.  -     TSH 3RD GENERATION; Future  4. Dysuria  -     URINALYSIS W/ RFLX MICROSCOPIC; Future  -     CT/NG/T.VAGINALIS AMPLIFICATION; Future  -     CULTURE, URINE; Future    Follow-up and Dispositions    Return for follow up for VV in 2 weeks for lab and US results. SUBJECTIVE:  Chest Pressure:  started with chest pressure, headache, vomiting some phlegm. Took some Advil and it resolved. Felt chills overnight. No recurrence. Pain, heavy menses:  5 months for menses being heavier with more pain. Monthly but may last 8 days and at times with clots. No abnormal vaginal discharge. Not sexually active frequently but does have dyspareunia. Review of Systems   Constitutional:  Negative for chills and fever. Respiratory:  Negative for cough and shortness of breath. OBJECTIVE:  Blood pressure 98/62, pulse 63, temperature 97.9 °F (36.6 °C), temperature source Temporal, height 4' 7.71\" (1.415 m), weight 112 lb 12.8 oz (51.2 kg), last menstrual period 2022, SpO2 99 %. Physical Exam  CONSTITUTIONAL:  Well developed. No apparent distress. PSYCHIATRIC: Oriented to time, place, person & situation. Appropriate mood and affect.   NECK:  Normal inspection, normal palpation without any lymphadenopathy, masses, or thyromegaly  CARDIOVASCULAR:  Regular rate and rhythm. Normal S1, S2. No extra sounds. RESPIRATORY:  Normal effort. Normal ascultation without wheezing. ABDOMEN:  Normal bowel sounds. Abdomen soft. No hepatosplenomegaly or masses. Most discomfort is suprapubic and Rt pelvis. EXTREMITIES:  No edema. No results found for this visit on 09/15/22. An electronic signature was used to authenticate this note.   -- Leslye Ramires MD

## 2022-09-16 LAB
ALBUMIN SERPL-MCNC: 4.2 G/DL (ref 3.5–5)
ALBUMIN/GLOB SERPL: 1.1 {RATIO} (ref 1.1–2.2)
ALP SERPL-CCNC: 102 U/L (ref 45–117)
ALT SERPL-CCNC: 37 U/L (ref 12–78)
ANION GAP SERPL CALC-SCNC: 4 MMOL/L (ref 5–15)
APPEARANCE UR: CLEAR
AST SERPL-CCNC: 14 U/L (ref 15–37)
BACTERIA URNS QL MICRO: NEGATIVE /HPF
BASOPHILS # BLD: 0.1 K/UL (ref 0–0.1)
BASOPHILS NFR BLD: 1 % (ref 0–1)
BILIRUB SERPL-MCNC: 0.4 MG/DL (ref 0.2–1)
BILIRUB UR QL: NEGATIVE
BUN SERPL-MCNC: 12 MG/DL (ref 6–20)
BUN/CREAT SERPL: 18 (ref 12–20)
CALCIUM SERPL-MCNC: 8.9 MG/DL (ref 8.5–10.1)
CHLORIDE SERPL-SCNC: 107 MMOL/L (ref 97–108)
CO2 SERPL-SCNC: 28 MMOL/L (ref 21–32)
COLOR UR: ABNORMAL
CREAT SERPL-MCNC: 0.66 MG/DL (ref 0.55–1.02)
CRP SERPL-MCNC: 0.36 MG/DL (ref 0–0.6)
DIFFERENTIAL METHOD BLD: NORMAL
EOSINOPHIL # BLD: 0.2 K/UL (ref 0–0.4)
EOSINOPHIL NFR BLD: 3 % (ref 0–7)
EPITH CASTS URNS QL MICRO: ABNORMAL /LPF
ERYTHROCYTE [DISTWIDTH] IN BLOOD BY AUTOMATED COUNT: 12 % (ref 11.5–14.5)
GLOBULIN SER CALC-MCNC: 3.9 G/DL (ref 2–4)
GLUCOSE SERPL-MCNC: 89 MG/DL (ref 65–100)
GLUCOSE UR STRIP.AUTO-MCNC: NEGATIVE MG/DL
HCT VFR BLD AUTO: 45 % (ref 35–47)
HGB BLD-MCNC: 15 G/DL (ref 11.5–16)
HGB UR QL STRIP: ABNORMAL
HYALINE CASTS URNS QL MICRO: ABNORMAL /LPF (ref 0–5)
IMM GRANULOCYTES # BLD AUTO: 0 K/UL (ref 0–0.04)
IMM GRANULOCYTES NFR BLD AUTO: 0 % (ref 0–0.5)
KETONES UR QL STRIP.AUTO: NEGATIVE MG/DL
LEUKOCYTE ESTERASE UR QL STRIP.AUTO: NEGATIVE
LYMPHOCYTES # BLD: 3.5 K/UL (ref 0.8–3.5)
LYMPHOCYTES NFR BLD: 41 % (ref 12–49)
MCH RBC QN AUTO: 32.5 PG (ref 26–34)
MCHC RBC AUTO-ENTMCNC: 33.3 G/DL (ref 30–36.5)
MCV RBC AUTO: 97.4 FL (ref 80–99)
MONOCYTES # BLD: 0.7 K/UL (ref 0–1)
MONOCYTES NFR BLD: 8 % (ref 5–13)
NEUTS SEG # BLD: 4.1 K/UL (ref 1.8–8)
NEUTS SEG NFR BLD: 47 % (ref 32–75)
NITRITE UR QL STRIP.AUTO: NEGATIVE
NRBC # BLD: 0 K/UL (ref 0–0.01)
NRBC BLD-RTO: 0 PER 100 WBC
PH UR STRIP: 7 [PH] (ref 5–8)
PLATELET # BLD AUTO: 390 K/UL (ref 150–400)
PMV BLD AUTO: 9.3 FL (ref 8.9–12.9)
POTASSIUM SERPL-SCNC: 4.4 MMOL/L (ref 3.5–5.1)
PROT SERPL-MCNC: 8.1 G/DL (ref 6.4–8.2)
PROT UR STRIP-MCNC: NEGATIVE MG/DL
RBC # BLD AUTO: 4.62 M/UL (ref 3.8–5.2)
RBC #/AREA URNS HPF: ABNORMAL /HPF (ref 0–5)
SODIUM SERPL-SCNC: 139 MMOL/L (ref 136–145)
SP GR UR REFRACTOMETRY: 1.01 (ref 1–1.03)
TSH SERPL DL<=0.05 MIU/L-ACNC: 1.34 UIU/ML (ref 0.36–3.74)
UROBILINOGEN UR QL STRIP.AUTO: 0.2 EU/DL (ref 0.2–1)
WBC # BLD AUTO: 8.6 K/UL (ref 3.6–11)
WBC URNS QL MICRO: ABNORMAL /HPF (ref 0–4)

## 2022-09-17 LAB
BACTERIA SPEC CULT: NORMAL
SERVICE CMNT-IMP: NORMAL

## 2022-09-21 LAB
C TRACH RRNA SPEC QL NAA+PROBE: NEGATIVE
N GONORRHOEA RRNA SPEC QL NAA+PROBE: NEGATIVE
SPECIMEN SOURCE: NORMAL
T VAGINALIS RRNA SPEC QL NAA+PROBE: NEGATIVE

## 2022-10-03 ENCOUNTER — HOSPITAL ENCOUNTER (OUTPATIENT)
Dept: ULTRASOUND IMAGING | Age: 32
Discharge: HOME | End: 2022-10-03
Attending: FAMILY MEDICINE

## 2022-10-03 ENCOUNTER — VIRTUAL VISIT (OUTPATIENT)
Dept: FAMILY MEDICINE CLINIC | Age: 32
End: 2022-10-03

## 2022-10-03 DIAGNOSIS — R10.2 PELVIC PAIN: ICD-10-CM

## 2022-10-03 DIAGNOSIS — R10.2 PELVIC PAIN: Primary | ICD-10-CM

## 2022-10-03 PROCEDURE — 76856 US EXAM PELVIC COMPLETE: CPT

## 2022-10-03 PROCEDURE — 99441 PR PHYS/QHP TELEPHONE EVALUATION 5-10 MIN: CPT | Performed by: FAMILY MEDICINE

## 2022-10-03 PROCEDURE — 76830 TRANSVAGINAL US NON-OB: CPT

## 2022-10-03 NOTE — PROGRESS NOTES
Intake with Paul Arcos . The pt verified her name and . Does not know the   Coordination of Care  1. Have you been to the ER, urgent care clinic since your last visit? Hospitalized since your last visit? No    2. Have you seen or consulted any other health care providers outside of the [address] [place] [address] since your last visit? Include any pap smears or colon screening. No    Does the patient need refills?  YES    Learning Assessment Complete? no  Depression Screening complete in the past 12 months? yes

## 2022-10-03 NOTE — PROGRESS NOTES
Kellie Syed Lajpop (: 1990) is a 28 y.o. female, established patient, Virtual Visit for evaluation of the following chief complaint(s):   Abdominal Pain (Follow up. ) and Heart Problem (Pressure in the chest sob, depression, heart palpatations, Follow up. )       ASSESSMENT/PLAN:  1. Pelvic pain  Will reschedule Pelvic US. No follow-ups on file. SUBJECTIVE/OBJECTIVE:  VV for lab review and follow up. Pain, heavy menses:  5 months for menses being heavier with more pain. Reviewed labs which were unrevealing. She had to reschedule her US and has not been able to set up a new date. Chest Pressure:  this has resolved. Review of Systems     Patient-Reported LMP: 22    Physical Exam    On this date 10/03/2022 I have spent 5 minutes reviewing previous notes, test results and face to face (virtual) with the patient discussing the diagnosis and importance of compliance with the treatment plan as well as documenting on the day of the visit. Kellie Silvaalma Gibbs, was evaluated through a synchronous (real-time) audio-video encounter. The patient (or guardian if applicable) is aware that this is a billable service, which includes applicable co-pays. This Virtual Visit was conducted with patient's (and/or legal guardian's) consent. The visit was conducted pursuant to the emergency declaration under the [address] [place] [address], [address] [place] [address] and the [employer] Act. Patient identification was verified, and a caregiver was present when appropriate. The patient was located at: Home: [phone]  The provider was located at: Home: [unfilled]     Patient identification was verified at the start of the visit: YES    Services were provided through a phone synchronous discussion virtually to substitute for in-person clinic visit.  Patient was located at home and provider was located in office or at home. An electronic signature was used to authenticate this note.   -- Jose Storm MD

## 2022-10-03 NOTE — PROGRESS NOTES
Discharge with Denys Negro. The pt verified her name and . The pt was asked if she would like for us to schedule her U/S's pelvic and transvaginal. The pt prefers [address] [place] [address] today, if not available today then late in the afternoon/ evening on any other day. 3:30 pm, the pt was told to arrive at 3pm today. The pt was instructed to drink 32-48 oz of water / clear liquids 1 hr before her appt. and do not urinate.  Rock Knapp RN

## 2022-10-04 NOTE — PROGRESS NOTES
Spoke with patient.   Reviewed US results which are normal.  For her painful menses she plans to try Ibuprofen for 1st 3 days of menses and if not improved will follow up for OCP trial.

## 2022-11-07 ENCOUNTER — TELEPHONE (OUTPATIENT)
Dept: FAMILY MEDICINE CLINIC | Age: 32
End: 2022-11-07

## 2022-12-08 ENCOUNTER — TELEPHONE (OUTPATIENT)
Dept: FAMILY MEDICINE CLINIC | Age: 32
End: 2022-12-08

## 2023-02-20 ENCOUNTER — TELEPHONE (OUTPATIENT)
Dept: FAMILY MEDICINE CLINIC | Age: 33
End: 2023-02-20

## 2023-05-18 RX ORDER — FAMOTIDINE 40 MG/1
1 TABLET, FILM COATED ORAL NIGHTLY
COMMUNITY
Start: 2021-08-30

## 2023-07-12 NOTE — PROGRESS NOTES
Roswell Park Comprehensive Cancer Center Dermatology      3. For patients aged 43-73: Has the patient had a colonoscopy / FIT/ Cologuard? Yes      If the patient is female:    4. For patients aged 43-66: Has the patient had a mammogram within the past 2 years? Yes       5. For patients aged 21-65: Has the patient had a pap smear?  Yes Coordination of Care  1. Have you been to the ER, urgent care clinic since your last visit? Hospitalized since your last visit? No    2. Have you seen or consulted any other health care providers outside of the [address] [place] [address] since your last visit? Include any pap smears or colon screening. No    Does the patient need refills? NO    Learning Assessment Complete? yes  Depression Screening complete in the past 12 months? yes